# Patient Record
(demographics unavailable — no encounter records)

---

## 2025-02-07 NOTE — PHYSICAL EXAM
[TextEntry] : Gen: Pleasant thin woman in no distress HEENT: Sclera non-icteric, conjunctiva non-injected; oropharynx clear w/o thrush;  Neck: supple w/o cervical LAD; no bruits CV: Regular rate and rhythm, no murmurs, rubs or gallops Lungs: faint bibasilar inspiratory squaks  Extremities: no clubbing, cyanosis or edema Skin: no rash Psych: normal mood and affect Neuro:  non-focal

## 2025-02-07 NOTE — IMMUNIZATIONS
[TextEntry] : Vaccination history: Last COVID: Nov 2023 Last flu: Oct 2023 RSV: Oct 2023 Last pneumococcal / type: Prevnar 20 (?) in ~ 2022

## 2025-02-07 NOTE — ASSESSMENT
[FreeTextEntry1] : 68 yo woman with bronchiectasis. Asymptomatic but with radiographic appearance that worsened between 2/16/21 and 1/23/25. Waxing and waning b/w 3/23/24 and 1/23/25. H/o hemoptysis x 1. Slight improvement in weight (BMI 18.9)  2/3/25 Sp Cx: pending  Unable to expectorate mucus despite aggressive airway clearance with 7% saline and Aerobika BID. Tired Vest therapy for 3 weeks - no effect and uncomfortable.  will take a break from ACT (there is NOTHING coming up) continue monitoring Sputum cx for NTM Referral to pulmonary wellness for mucus clearance and exercise Repeat CT in July 2025

## 2025-02-07 NOTE — HISTORY OF PRESENT ILLNESS
[Never] : never [TextBox_4] : 68 yo woman with bronchiectasis previously followed by Dr. Alfonso Flores in the 2010's when presented with hemoptysis  Aerobika and a nebulizer with 7% hypertonic saline twice a day.  no cough no mucus production - even with hypersal/aerobika no sob walking 2-4 mi per day no wheezing or chest tightness stress in family (daughter with met lelo ca) wt loss of 12 lb in 3 yrs; BMI 18.5  9/18/24: feeling well no cough doing 7% saline and aerobika twice a day but NOT bringing up any sputum no dyspnea walks daily no hemoptysis no longer losing weight lost daughter to cancer earlier this year  2/7/25: feels well does ACT with 7% saline and aerobika twice daily as she has for > 2 years but no mucus ever comes up recent Chest CT was read as worsening but to me looks like waxing and waning (another radiologist concurred with my opinion)

## 2025-02-07 NOTE — RESULTS/DATA
[TextEntry] : RADIOLOGY 25 IMPRESSION: Findings consistent with worsening large and small airways disease with a slight waxing and waning pattern of bronchial luminal impaction tree-in-bud micronodules and nodular areas of consolidation. Chronic infectious and/or inflammatory etiologies such as nontuberculous mycobacterial infection cannot be excluded.   Chest CT 3/23/24 IMPRESSION: Since 4/3/2023, there has been worsening of large and small airways disease with worsening areas of bronchial luminal impaction, tree-in-bud nodules, and nodular areas of consolidation bilaterally. This likely represents infection, such as nontuberculous mycobacterial infection.   PFT 2024 FVC: 2.53 L, 84% predicted FEV1: 1.88 L, 82% predicted FEV1/FVC 74% Impression normal spirometry Exhaled nitric oxide: 28 ppb (2024)   PFT 23  FVC: 2.57 L (91%)--> 2.62 L (93%)  FEV1: 1.92 L (91%)--> 1.88 L (89%)  FEV1/FVC: 75%--> 72%  MUV91-81%: 1.61 L/s (103%)--> 1.23 L/s (78%)  T.34 L (105%)  RV/T%  DLCO: 12.53 (61%)    6MWT 23: 548 meters, SpO2 99% -> 95%  PFT 3/31/21  FVC: 2.70 L (92%)--> 2.70 L (92%)  FEV1: 1.97 L (89%)--> 1.98 L (90%)  FEV1/FVC: 73%--> 73%  RNE43-18%: 1.20 L/s (70%)--> 1.40 L/s (82%)  T.22 L (102%)  RV/T%  DLCO: 17.36 (82%)  Normal spirometry.  Normal lung volumes  normal diffusion capacity    6MWT 3/31/21  Patient walked 515 meters during a 6 minute walk test.  Resting O2 saturation was 99% on RA. Heart rate 111 bpm.  End test O2 saturation was 96% on RA. Heart rate 125 bpm. Imani scale end of test "0"  This signifies normal walk distance, no desaturation.    EKG / ECHO      MICRO     PATH    OTHER LABS OF NOTE -normal CMP, C1, C2, C3 and C4 complement, IPEP, CH50, negative cystic fibrosis panel  -IgG3 elevated, other IgG subsets are within normal range.

## 2025-04-10 NOTE — PHYSICAL EXAM
[TextEntry] : Gen: Pleasant thin woman in no distress HEENT: Sclera non-icteric, conjunctiva non-injected; oropharynx clear w/o thrush;  Neck: supple w/o cervical LAD; no bruits CV: Regular rate and rhythm, no murmurs, rubs or gallops Lungs: faint bilateral inspiratory squaks and left-sided crackles Extremities: no clubbing, cyanosis or edema Skin: no rash Psych: normal mood and affect Neuro:  non-focal

## 2025-04-10 NOTE — RESULTS/DATA
[TextEntry] : RADIOLOGY 4/10/25 CXR PA and LAT bronchiectatic changes in left mid-lung; unchanged c/w  of 25 chest CT  25 IMPRESSION: Findings consistent with worsening large and small airways disease with a slight waxing and waning pattern of bronchial luminal impaction tree-in-bud micronodules and nodular areas of consolidation. Chronic infectious and/or inflammatory etiologies such as nontuberculous mycobacterial infection cannot be excluded.   Chest CT 3/23/24 IMPRESSION: Since 4/3/2023, there has been worsening of large and small airways disease with worsening areas of bronchial luminal impaction, tree-in-bud nodules, and nodular areas of consolidation bilaterally. This likely represents infection, such as nontuberculous mycobacterial infection.   PFT 2024 FVC: 2.53 L, 84% predicted FEV1: 1.88 L, 82% predicted FEV1/FVC 74% Impression normal spirometry Exhaled nitric oxide: 28 ppb (2024)   PFT 23  FVC: 2.57 L (91%)--> 2.62 L (93%)  FEV1: 1.92 L (91%)--> 1.88 L (89%)  FEV1/FVC: 75%--> 72%  [id number]%: 1.61 L/s (103%)--> 1.23 L/s (78%)  T.34 L (105%)  RV/T%  DLCO: 12.53 (61%)    6MWT 23: 548 meters, SpO2 99% -> 95%  PFT 3/31/21  FVC: 2.70 L (92%)--> 2.70 L (92%)  FEV1: 1.97 L (89%)--> 1.98 L (90%)  FEV1/FVC: 73%--> 73%  AAI39-35%: 1.20 L/s (70%)--> 1.40 L/s (82%)  T.22 L (102%)  RV/T%  DLCO: 17.36 (82%)  Normal spirometry.  Normal lung volumes  normal diffusion capacity    6MWT 3/31/21  Patient walked 515 meters during a 6 minute walk test.  Resting O2 saturation was 99% on RA. Heart rate 111 bpm.  End test O2 saturation was 96% on RA. Heart rate 125 bpm. Imani scale end of test "0"  This signifies normal walk distance, no desaturation.    EKG / ECHO      MICRO     PATH    OTHER LABS OF NOTE -normal CMP, C1, C2, C3 and C4 complement, IPEP, CH50, negative cystic fibrosis panel  -IgG3 elevated, other IgG subsets are within normal range.

## 2025-04-10 NOTE — ASSESSMENT
[FreeTextEntry1] : 70 yo woman with bronchiectasis. Asymptomatic but with radiographic appearance that worsened between 2/16/21 and 1/23/25. Waxing and waning b/w 3/23/24 and 1/23/25. H/o hemoptysis x 1. Slight improvement in weight (BMI 18.9)  # What sounds like a viral URI and post-viral cough  --In office CXR unchanged --Tessalon ordered for cough --Resp swab done   F/U after CT in July

## 2025-04-10 NOTE — HISTORY OF PRESENT ILLNESS
[Never] : never [TextBox_4] : 70 yo woman with bronchiectasis previously followed by Dr. Hamilton  Dx in the 2010's when presented with hemoptysis  Aerobika and a nebulizer with 7% hypertonic saline twice a day.  no cough no mucus production - even with hypersal/aerobika no sob walking 2-4 mi per day no wheezing or chest tightness stress in family (daughter with met lelo ca) wt loss of 12 lb in 3 yrs; BMI 18.5  9/18/24: feeling well no cough doing 7% saline and aerobika twice a day but NOT bringing up any sputum no dyspnea walks daily no hemoptysis no longer losing weight lost daughter to cancer earlier this year  2/7/25: feels well does ACT with 7% saline and aerobika twice daily as she has for > 2 years but no mucus ever comes up recent Chest CT was read as worsening but to me looks like waxing and waning (another radiologist concurred with my opinion)  4/9/25 1 month ago, headache, chills, cold symptoms for a few weeks, went to PCP, did blood work & nasal swab which were normal, got amoxicillin, then felt better  Cough started shortly after and felt sick again, cough started 10 days ago   Reports chest tightness, used Aerobika 1x/day, felt better and produced green mucus  Denies sick contacts but is always around her grandchildren so it is possible  Saturday had a low-grade fever of 99.5, reported feeling achy  Over the last 48 hrs, no fevers + SOB, + fatigue, c/o dry persistent cough

## 2025-04-10 NOTE — ASSESSMENT
[FreeTextEntry1] : 68 yo woman with bronchiectasis. Asymptomatic but with radiographic appearance that worsened between 2/16/21 and 1/23/25. Waxing and waning b/w 3/23/24 and 1/23/25. H/o hemoptysis x 1. Slight improvement in weight (BMI 18.9)  # What sounds like a viral URI and post-viral cough  --In office CXR unchanged --Tessalon ordered for cough --Resp swab done   F/U after CT in July

## 2025-04-10 NOTE — IMMUNIZATIONS
[TextEntry] : Vaccination history: Last COVID: Nov 2023 Last flu: Oct 2023 RSV: Oct 2023 Last pneumococcal / type: Prevnar 20 (?) in ~ 2022 Patient

## 2025-04-10 NOTE — RESULTS/DATA
[TextEntry] : RADIOLOGY 4/10/25 CXR PA and LAT bronchiectatic changes in left mid-lung; unchanged c/w  of 25 chest CT  25 IMPRESSION: Findings consistent with worsening large and small airways disease with a slight waxing and waning pattern of bronchial luminal impaction tree-in-bud micronodules and nodular areas of consolidation. Chronic infectious and/or inflammatory etiologies such as nontuberculous mycobacterial infection cannot be excluded.   Chest CT 3/23/24 IMPRESSION: Since 4/3/2023, there has been worsening of large and small airways disease with worsening areas of bronchial luminal impaction, tree-in-bud nodules, and nodular areas of consolidation bilaterally. This likely represents infection, such as nontuberculous mycobacterial infection.   PFT 2024 FVC: 2.53 L, 84% predicted FEV1: 1.88 L, 82% predicted FEV1/FVC 74% Impression normal spirometry Exhaled nitric oxide: 28 ppb (2024)   PFT 23  FVC: 2.57 L (91%)--> 2.62 L (93%)  FEV1: 1.92 L (91%)--> 1.88 L (89%)  FEV1/FVC: 75%--> 72%  XQB32-00%: 1.61 L/s (103%)--> 1.23 L/s (78%)  T.34 L (105%)  RV/T%  DLCO: 12.53 (61%)    6MWT 23: 548 meters, SpO2 99% -> 95%  PFT 3/31/21  FVC: 2.70 L (92%)--> 2.70 L (92%)  FEV1: 1.97 L (89%)--> 1.98 L (90%)  FEV1/FVC: 73%--> 73%  DCU74-38%: 1.20 L/s (70%)--> 1.40 L/s (82%)  T.22 L (102%)  RV/T%  DLCO: 17.36 (82%)  Normal spirometry.  Normal lung volumes  normal diffusion capacity    6MWT 3/31/21  Patient walked 515 meters during a 6 minute walk test.  Resting O2 saturation was 99% on RA. Heart rate 111 bpm.  End test O2 saturation was 96% on RA. Heart rate 125 bpm. Imani scale end of test "0"  This signifies normal walk distance, no desaturation.    EKG / ECHO      MICRO     PATH    OTHER LABS OF NOTE -normal CMP, C1, C2, C3 and C4 complement, IPEP, CH50, negative cystic fibrosis panel  -IgG3 elevated, other IgG subsets are within normal range.

## 2025-06-11 NOTE — HISTORY OF PRESENT ILLNESS
[Never] : never [TextBox_4] : 68 yo woman with bronchiectasis previously followed by Dr. Hamilton  Dx in the 2010's when presented with hemoptysis  Aerobika and a nebulizer with 7% hypertonic saline twice a day.  no cough no mucus production - even with hypersal/aerobika no sob walking 2-4 mi per day no wheezing or chest tightness stress in family (daughter with met lelo ca) wt loss of 12 lb in 3 yrs; BMI 18.5  9/18/24: feeling well no cough doing 7% saline and aerobika twice a day but NOT bringing up any sputum no dyspnea walks daily no hemoptysis no longer losing weight lost daughter to cancer earlier this year  2/7/25: feels well does ACT with 7% saline and aerobika twice daily as she has for > 2 years but no mucus ever comes up recent Chest CT was read as worsening but to me looks like waxing and waning (another radiologist concurred with my opinion)  4/9/25 1 month ago, headache, chills, cold symptoms for a few weeks, went to PCP, did blood work & nasal swab which were normal, got amoxicillin, then felt better  Cough started shortly after and felt sick again, cough started 10 days ago   Reports chest tightness, used Aerobika 1x/day, felt better and produced green mucus  Denies sick contacts but is always around her grandchildren so it is possible  Saturday had a low-grade fever of 99.5, reported feeling achy  Over the last 48 hrs, no fevers + SOB, + fatigue, c/o dry persistent cough  yes...

## 2025-07-30 NOTE — RESULTS/DATA
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   4/10/25 CXR PA and LAT bronchiectatic changes in left mid-lung; unchanged c/w  of 25 chest CT  25 IMPRESSION: Findings consistent with worsening large and small airways disease with a slight waxing and waning pattern of bronchial luminal impaction tree-in-bud micronodules and nodular areas of consolidation. Chronic infectious and/or inflammatory etiologies such as nontuberculous mycobacterial infection cannot be excluded.   Chest CT 3/23/24 IMPRESSION: Since 4/3/2023, there has been worsening of large and small airways disease with worsening areas of bronchial luminal impaction, tree-in-bud nodules, and nodular areas of consolidation bilaterally. This likely represents infection, such as nontuberculous mycobacterial infection.   PFT 2024 FVC: 2.53 L, 84% predicted FEV1: 1.88 L, 82% predicted FEV1/FVC 74% Impression normal spirometry Exhaled nitric oxide: 28 ppb (2024)   PFT 23  FVC: 2.57 L (91%)--> 2.62 L (93%)  FEV1: 1.92 L (91%)--> 1.88 L (89%)  FEV1/FVC: 75%--> 72%  PQR23-35%: 1.61 L/s (103%)--> 1.23 L/s (78%)  T.34 L (105%)  RV/T%  DLCO: 12.53 (61%)    6MWT 23: 548 meters, SpO2 99% -> 95%  PFT 3/31/21  FVC: 2.70 L (92%)--> 2.70 L (92%)  FEV1: 1.97 L (89%)--> 1.98 L (90%)  FEV1/FVC: 73%--> 73%  CAI05-33%: 1.20 L/s (70%)--> 1.40 L/s (82%)  T.22 L (102%)  RV/T%  DLCO: 17.36 (82%)  Normal spirometry.  Normal lung volumes  normal diffusion capacity    6MWT 3/31/21  Patient walked 515 meters during a 6 minute walk test.  Resting O2 saturation was 99% on RA. Heart rate 111 bpm.  End test O2 saturation was 96% on RA. Heart rate 125 bpm. Imani scale end of test "0"  This signifies normal walk distance, no desaturation.    EKG / ECHO      MICRO     PATH    OTHER LABS OF NOTE -normal CMP, C1, C2, C3 and C4 complement, IPEP, CH50, negative cystic fibrosis panel  -IgG3 elevated, other IgG subsets are within normal range.

## 2025-07-30 NOTE — RESULTS/DATA
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   4/10/25 CXR PA and LAT bronchiectatic changes in left mid-lung; unchanged c/w  of 25 chest CT  25 IMPRESSION: Findings consistent with worsening large and small airways disease with a slight waxing and waning pattern of bronchial luminal impaction tree-in-bud micronodules and nodular areas of consolidation. Chronic infectious and/or inflammatory etiologies such as nontuberculous mycobacterial infection cannot be excluded.   Chest CT 3/23/24 IMPRESSION: Since 4/3/2023, there has been worsening of large and small airways disease with worsening areas of bronchial luminal impaction, tree-in-bud nodules, and nodular areas of consolidation bilaterally. This likely represents infection, such as nontuberculous mycobacterial infection.   PFT 2024 FVC: 2.53 L, 84% predicted FEV1: 1.88 L, 82% predicted FEV1/FVC 74% Impression normal spirometry Exhaled nitric oxide: 28 ppb (2024)   PFT 23  FVC: 2.57 L (91%)--> 2.62 L (93%)  FEV1: 1.92 L (91%)--> 1.88 L (89%)  FEV1/FVC: 75%--> 72%  WRH88-03%: 1.61 L/s (103%)--> 1.23 L/s (78%)  T.34 L (105%)  RV/T%  DLCO: 12.53 (61%)    6MWT 23: 548 meters, SpO2 99% -> 95%  PFT 3/31/21  FVC: 2.70 L (92%)--> 2.70 L (92%)  FEV1: 1.97 L (89%)--> 1.98 L (90%)  FEV1/FVC: 73%--> 73%  MPB80-25%: 1.20 L/s (70%)--> 1.40 L/s (82%)  T.22 L (102%)  RV/T%  DLCO: 17.36 (82%)  Normal spirometry.  Normal lung volumes  normal diffusion capacity    6MWT 3/31/21  Patient walked 515 meters during a 6 minute walk test.  Resting O2 saturation was 99% on RA. Heart rate 111 bpm.  End test O2 saturation was 96% on RA. Heart rate 125 bpm. Imani scale end of test "0"  This signifies normal walk distance, no desaturation.    EKG / ECHO      MICRO     PATH    OTHER LABS OF NOTE -normal CMP, C1, C2, C3 and C4 complement, IPEP, CH50, negative cystic fibrosis panel  -IgG3 elevated, other IgG subsets are within normal range.

## 2025-07-30 NOTE — HISTORY OF PRESENT ILLNESS
[Never] : never [TextBox_4] : 70 yo woman with bronchiectasis previously followed by Dr. Hamilton  Dx in the 2010's when presented with hemoptysis  Aerobika and a nebulizer with 7% hypertonic saline twice a day.  no cough no mucus production - even with hypersal/aerobika no sob walking 2-4 mi per day no wheezing or chest tightness stress in family (daughter with met lelo ca) wt loss of 12 lb in 3 yrs; BMI 18.5  9/18/24: feeling well no cough doing 7% saline and aerobika twice a day but NOT bringing up any sputum no dyspnea walks daily no hemoptysis no longer losing weight lost daughter to cancer earlier this year  2/7/25: feels well does ACT with 7% saline and aerobika twice daily as she has for > 2 years but no mucus ever comes up recent Chest CT was read as worsening but to me looks like waxing and waning (another radiologist concurred with my opinion)  4/9/25 1 month ago, headache, chills, cold symptoms for a few weeks, went to PCP, did blood work & nasal swab which were normal, got amoxicillin, then felt better  Cough started shortly after and felt sick again, cough started 10 days ago   Reports chest tightness, used Aerobika 1x/day, felt better and produced green mucus  Denies sick contacts but is always around her grandchildren so it is possible  Saturday had a low-grade fever of 99.5, reported feeling achy  Over the last 48 hrs, no fevers + SOB, + fatigue, c/o dry persistent cough   7/30/25 feeling back to baseline no cough no shortness of breath no ACT

## 2025-07-30 NOTE — ASSESSMENT
[FreeTextEntry1] : 69 yo woman with bronchiectasis. Asymptomatic but with radiographic appearance that worsened between 2/16/21 and now. MAC+ x 2. No cavities.  # Bronchiectasis. # NTM PD  Discussed pros and cons of abx rx of MAC -for now will continue to monitor -discussed way to reduce exposure  No longer doing ACT b/c there is absolutely no mucus expectoration  FU 6mo with CT and PFT

## 2025-07-30 NOTE — ASSESSMENT
[FreeTextEntry1] : 71 yo woman with bronchiectasis. Asymptomatic but with radiographic appearance that worsened between 2/16/21 and now. MAC+ x 2. No cavities.  # Bronchiectasis. # NTM PD  Discussed pros and cons of abx rx of MAC -for now will continue to monitor -discussed way to reduce exposure  No longer doing ACT b/c there is absolutely no mucus expectoration  FU 6mo with CT and PFT